# Patient Record
Sex: MALE | Race: OTHER
[De-identification: names, ages, dates, MRNs, and addresses within clinical notes are randomized per-mention and may not be internally consistent; named-entity substitution may affect disease eponyms.]

---

## 2019-12-20 ENCOUNTER — HOSPITAL ENCOUNTER (EMERGENCY)
Dept: HOSPITAL 88 - FSED | Age: 29
Discharge: HOME | End: 2019-12-20
Payer: COMMERCIAL

## 2019-12-20 VITALS — HEIGHT: 70 IN | WEIGHT: 178 LBS | BODY MASS INDEX: 25.48 KG/M2
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
## 2019-12-20 DIAGNOSIS — S67.195A: Primary | ICD-10-CM

## 2019-12-20 DIAGNOSIS — S60.032A: ICD-10-CM
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
## 2019-12-20 DIAGNOSIS — S62.635A: ICD-10-CM

## 2019-12-20 DIAGNOSIS — S67.193A: ICD-10-CM
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
## 2019-12-20 PROCEDURE — 99283 EMERGENCY DEPT VISIT LOW MDM: CPT

## 2019-12-20 NOTE — DIAGNOSTIC IMAGING REPORT
FINGER LT - HOPD - 3 views



HISTORY:  Pain

COMPARISON: None available.

     

FINDINGS:

See impression.





IMPRESSION: 

Minimally displaced fracture of the tuft of the distal phalanx of the left

fourth digit.



Signed by: Dr. William Pena MD on 12/20/2019 10:43 PM